# Patient Record
Sex: FEMALE | Race: BLACK OR AFRICAN AMERICAN | NOT HISPANIC OR LATINO | URBAN - METROPOLITAN AREA
[De-identification: names, ages, dates, MRNs, and addresses within clinical notes are randomized per-mention and may not be internally consistent; named-entity substitution may affect disease eponyms.]

---

## 2022-07-28 ENCOUNTER — EMERGENCY (EMERGENCY)
Facility: HOSPITAL | Age: 57
LOS: 0 days | Discharge: LEFT BEFORE DISCHARGE | End: 2022-07-28
Attending: EMERGENCY MEDICINE | Admitting: EMERGENCY MEDICINE

## 2022-07-28 VITALS
SYSTOLIC BLOOD PRESSURE: 140 MMHG | OXYGEN SATURATION: 99 % | RESPIRATION RATE: 20 BRPM | TEMPERATURE: 98 F | DIASTOLIC BLOOD PRESSURE: 60 MMHG | HEART RATE: 80 BPM

## 2022-07-28 DIAGNOSIS — R10.9 UNSPECIFIED ABDOMINAL PAIN: ICD-10-CM

## 2022-07-28 DIAGNOSIS — Z53.21 PROCEDURE AND TREATMENT NOT CARRIED OUT DUE TO PATIENT LEAVING PRIOR TO BEING SEEN BY HEALTH CARE PROVIDER: ICD-10-CM

## 2022-07-28 PROCEDURE — L9991: CPT

## 2023-05-11 ENCOUNTER — APPOINTMENT (OUTPATIENT)
Dept: ORTHOPEDIC SURGERY | Facility: CLINIC | Age: 58
End: 2023-05-11

## 2023-05-11 PROBLEM — Z00.00 ENCOUNTER FOR PREVENTIVE HEALTH EXAMINATION: Status: ACTIVE | Noted: 2023-05-11

## 2023-05-25 ENCOUNTER — FORM ENCOUNTER (OUTPATIENT)
Age: 58
End: 2023-05-25

## 2023-05-25 ENCOUNTER — NON-APPOINTMENT (OUTPATIENT)
Age: 58
End: 2023-05-25

## 2023-05-25 ENCOUNTER — APPOINTMENT (OUTPATIENT)
Dept: ORTHOPEDIC SURGERY | Facility: CLINIC | Age: 58
End: 2023-05-25
Payer: OTHER MISCELLANEOUS

## 2023-05-25 PROCEDURE — 73030 X-RAY EXAM OF SHOULDER: CPT | Mod: RT

## 2023-05-25 PROCEDURE — 99203 OFFICE O/P NEW LOW 30 MIN: CPT | Mod: ACP

## 2023-05-25 RX ORDER — MELOXICAM 15 MG/1
15 TABLET ORAL
Qty: 30 | Refills: 1 | Status: ACTIVE | COMMUNITY
Start: 2023-05-25 | End: 1900-01-01

## 2023-05-25 NOTE — PHYSICAL EXAM
[de-identified] : Physical exam of the right shoulder:\par -No erythema, edema, ecchymosis or acute deformities.  Skin intact\par -No TTP over shoulder joint\par -ROM: forward flexion: 100 degrees, external rotation: 40 degrees, internal rotation: Hand reaches her pants pocket\par -Pain in all planes of motion\par -Decreased strength of forward flexion\par -+2 radial pulse, sensation intact to light touch

## 2023-05-25 NOTE — DATA REVIEWED
[FreeTextEntry1] : X-ray images were obtained at the office today.  AP external, AP internal, Y view of the right shoulder reveal no acute fractures, dislocations, bony abnormalities.

## 2023-05-25 NOTE — DISCUSSION/SUMMARY
[de-identified] : Patient has injury of the right shoulder.  At this time, due to patient's severely limited range of motion and her symptoms, an MRI is warranted for further evaluation of the internal structures, including rotator cuff, for potential surgery.  Patient's in the meantime will do conservative treatment such as alternating with a warm compress or ice.  I also sent patient a prescription for meloxicam 15 mg to take as needed for pain relief.  Patient will stay out of work until follow-up, she is total temporarily disabled.  Patient will call 2 to 3 days after the MRI to discuss results.  Patient agrees to above plan all questions were answered today.

## 2023-05-25 NOTE — HISTORY OF PRESENT ILLNESS
[de-identified] : 57-year-old female presents with left shoulder injury.  Patient was at work on 5/17/2023, and hit her shoulder on a metal piece of furniture.  Since then, patient has had extreme pain in her left shoulder and limited range of motion.  Patient went to pharmacy the next day, her x-rays were taken and read as negative.  Patient has taken over-the-counter anti-inflammatories for pain relief and applied topical rubs for pain relief.  Denies any numbness and tingling.  Denies any past injuries or surgeries to the left shoulder.  Patient is right-hand dominant.

## 2023-07-12 ENCOUNTER — APPOINTMENT (OUTPATIENT)
Dept: ORTHOPEDIC SURGERY | Facility: CLINIC | Age: 58
End: 2023-07-12

## 2023-08-31 ENCOUNTER — NON-APPOINTMENT (OUTPATIENT)
Age: 58
End: 2023-08-31

## 2023-09-01 ENCOUNTER — APPOINTMENT (OUTPATIENT)
Dept: ORTHOPEDIC SURGERY | Facility: CLINIC | Age: 58
End: 2023-09-01
Payer: OTHER MISCELLANEOUS

## 2023-09-01 PROCEDURE — 99214 OFFICE O/P EST MOD 30 MIN: CPT | Mod: 25

## 2023-09-01 PROCEDURE — 73030 X-RAY EXAM OF SHOULDER: CPT | Mod: RT

## 2023-09-01 PROCEDURE — 20611 DRAIN/INJ JOINT/BURSA W/US: CPT | Mod: RT

## 2023-09-01 NOTE — HISTORY OF PRESENT ILLNESS
[de-identified] : Patient is here for evaluation of her right shoulder which she got hurt at work May 17, 2023  On exam she goes 0 to 90 degrees internal rotation of post iliac crest  Impression is frozen shoulder right  X-rays taken show no arthritis no fractures no soft tissue calcifications  Recommended injection ultrasound-guided under sterile conditions of cortisone to the right shoulder she tolerated well we taught her home exercises for stretching as well as gave her an exercise sheet from [employer] as well as requesting formal therapy for the shoulder she unable to work on a percent temporary disabled we will see her back in 6 to 8 weeks Procedure Name: Large Joint Injection / Aspiration: Dexamethasone, Lidocaine Ultrasound and Guidance Large Joint Injection was performed because of pain. Anesthesia: ethyl chloride sprayed topically.. Dexamethasone 1 cc. Needle size: 22 gauge. 1.5 inch. Lidocaine: 2 cc. Needle size: 22 gauge , 1.5 inch.  Medication was injected in the joint. After verbal consent using sterile preparation and technique. The risks, benefits, and alternatives to cortisone injection were explained in full to the patient. Risks outlined include but are not limited to infection, sepsis, bleeding, scarring, skin discoloration, temporary increase in pain, syncopal episode, failure to resolve symptoms, allergic reaction, symptom recurrence, and elevation of blood sugar in diabetics. Patient understood the risks. All questions were answered. After discussion of options, patient requested an injection. Oral informed consent was obtained and sterile prep was done of the injection site. Sterile technique was utilized for the procedure including the preparation of the solutions used for the injection. Patient tolerated the procedure well. Advised to ice the injection site this evening. Prep with alcohol locally to site. Sterile technique used. Post Procedure Instructions:   Ultrasound Guidance was used for the following reasons: To visualize the needle in the joint.  visualization of the needle and placement of injection was performed without complication.

## 2023-11-08 ENCOUNTER — NON-APPOINTMENT (OUTPATIENT)
Age: 58
End: 2023-11-08

## 2023-11-08 ENCOUNTER — APPOINTMENT (OUTPATIENT)
Dept: ORTHOPEDIC SURGERY | Facility: CLINIC | Age: 58
End: 2023-11-08
Payer: OTHER MISCELLANEOUS

## 2023-11-08 PROCEDURE — 99213 OFFICE O/P EST LOW 20 MIN: CPT | Mod: 25

## 2023-11-08 PROCEDURE — 20611 DRAIN/INJ JOINT/BURSA W/US: CPT | Mod: RT

## 2023-12-19 ENCOUNTER — NON-APPOINTMENT (OUTPATIENT)
Age: 58
End: 2023-12-19

## 2023-12-20 ENCOUNTER — APPOINTMENT (OUTPATIENT)
Dept: ORTHOPEDIC SURGERY | Facility: CLINIC | Age: 58
End: 2023-12-20
Payer: OTHER MISCELLANEOUS

## 2023-12-20 PROCEDURE — 99075 MEDICAL TESTIMONY: CPT

## 2024-01-03 ENCOUNTER — APPOINTMENT (OUTPATIENT)
Dept: ORTHOPEDIC SURGERY | Facility: CLINIC | Age: 59
End: 2024-01-03

## 2024-01-19 ENCOUNTER — APPOINTMENT (OUTPATIENT)
Dept: ORTHOPEDIC SURGERY | Facility: CLINIC | Age: 59
End: 2024-01-19
Payer: OTHER MISCELLANEOUS

## 2024-01-19 PROCEDURE — 99213 OFFICE O/P EST LOW 20 MIN: CPT | Mod: ACP

## 2024-01-19 NOTE — HISTORY OF PRESENT ILLNESS
[de-identified] : 58-year-old female here for repeat evaluation of injury sting to the right shoulder, this is a work-related injury on May 17, 2023. Patient had developed frozen shoulder to the right shoulder. Patient states that she felt some mild improvement with the cortisone injection. Patient states that she had a approved therapy for her shoulder.

## 2024-01-19 NOTE — DISCUSSION/SUMMARY
[de-identified] : Impression: Right frozen shoulder  Plan: Patient was advised that she can only have 3 cortisone injections a year, the last injection was in November, at this time I advised to return back on about March for a cortisone injection, patient agrees. Patient was also advised for physical therapy, prescription was given.   Patient remained sterile temporarily stable unable to return to work.  Follow-up: 6 weeks.

## 2024-01-19 NOTE — IMAGING
[de-identified] : Examination of the right shoulder, patient has significant stiffness, decreased range of motion to active and passive.  Patient is only able to forward flex to about 85-90 degrees actively and passively. Patient has significant pain with any slight movement of the shoulder.

## 2024-03-01 ENCOUNTER — APPOINTMENT (OUTPATIENT)
Dept: ORTHOPEDIC SURGERY | Facility: CLINIC | Age: 59
End: 2024-03-01

## 2024-03-08 ENCOUNTER — APPOINTMENT (OUTPATIENT)
Dept: ORTHOPEDIC SURGERY | Facility: CLINIC | Age: 59
End: 2024-03-08
Payer: OTHER MISCELLANEOUS

## 2024-03-08 VITALS — BODY MASS INDEX: 25.48 KG/M2 | WEIGHT: 172 LBS | HEIGHT: 69 IN

## 2024-03-08 PROCEDURE — 99213 OFFICE O/P EST LOW 20 MIN: CPT | Mod: ACP

## 2024-03-08 NOTE — PHYSICAL EXAM
[Right] : right shoulder [4 ___] : forward flexion 4[unfilled]/5 [4___] : internal rotation 4[unfilled]/5 [] : motor and sensory intact distally [TWNoteComboBox7] : active forward flexion 95 degrees [TWNoteComboBox6] : internal rotation 45 degrees [de-identified] : active abduction 85 degrees [de-identified] : external rotation 45 degrees

## 2024-03-08 NOTE — DISCUSSION/SUMMARY
[de-identified] : Impression: Right shoulder injury and adhesive capsulitis  Plan: Patient was advised for physical therapy. Patient was advised for an MRI of the right shoulder to evaluate any possibility of rotator cuff injury and confirm adhesive capsulitis. Patient remains total temporary disabled unable to return to work. Patient was offered a cortisone injection, she is aware that she can only have 3 injections a year so she wants to hold off to that injection  Follow-up: 4 weeks with Dr. Franklin.

## 2024-03-08 NOTE — HISTORY OF PRESENT ILLNESS
[de-identified] : Patient is here for an evaluation of her right shoulder, patient states that her right shoulder pain is due to a work related injury on May 17, 2023. 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 Patient states that since the injury she has been suffering with significant pain, due to pain she was favoring her left arm and appears that she developed adhesive Capsulitis to the right shoulder due to Decreased range of motion on her right shoulder.  Retrospectively our chart indicates that patient was seen in our office on May 25, 2023, at that time patient started developing some painful range of motion to the right shoulder and decreased motor strength.  Patient was then advised for an MRI of the right shoulder, I believe the MRI was declined by the insurance carrier, patient was seen on September 1, 2023 by Dr. Franklin who advised for a cortisone injection for her frozen shoulder (adhesive capsulitis).  Patient has been taking meloxicam for pain, on her subsequent visit on November 8, 2023 patient was doing home therapy exercises, she continued having significant pain and no improvement on the range of motion of her shoulder, she was given another cortisone injection and once more refer for physical therapy. Patient was seen by me on January 19, 2024, at that time she expressed that the cortisone injection had improved some of the symptoms, and she mentioned that she was approved for physical therapy and today she is here for a repeat evaluation. Patient states that her Worker's Compensation case was denied and she was appealing her claim.

## 2024-04-18 ENCOUNTER — APPOINTMENT (OUTPATIENT)
Dept: MRI IMAGING | Facility: CLINIC | Age: 59
End: 2024-04-18

## 2024-04-18 ENCOUNTER — NON-APPOINTMENT (OUTPATIENT)
Age: 59
End: 2024-04-18

## 2024-04-19 ENCOUNTER — APPOINTMENT (OUTPATIENT)
Dept: ORTHOPEDIC SURGERY | Facility: CLINIC | Age: 59
End: 2024-04-19
Payer: OTHER MISCELLANEOUS

## 2024-04-19 PROCEDURE — 99213 OFFICE O/P EST LOW 20 MIN: CPT | Mod: ACP

## 2024-05-03 ENCOUNTER — APPOINTMENT (OUTPATIENT)
Dept: ORTHOPEDIC SURGERY | Facility: CLINIC | Age: 59
End: 2024-05-03

## 2024-05-04 ENCOUNTER — APPOINTMENT (OUTPATIENT)
Dept: MRI IMAGING | Facility: CLINIC | Age: 59
End: 2024-05-04
Payer: OTHER MISCELLANEOUS

## 2024-05-04 PROCEDURE — 73221 MRI JOINT UPR EXTREM W/O DYE: CPT | Mod: RT

## 2024-05-09 RX ORDER — DICLOFENAC SODIUM 75 MG/1
75 TABLET, DELAYED RELEASE ORAL
Qty: 60 | Refills: 0 | Status: ACTIVE | COMMUNITY
Start: 2024-05-09 | End: 1900-01-01

## 2024-05-09 RX ORDER — TIZANIDINE 4 MG/1
4 TABLET ORAL
Qty: 30 | Refills: 0 | Status: ACTIVE | COMMUNITY
Start: 2024-05-09 | End: 1900-01-01

## 2024-05-13 NOTE — PHYSICAL EXAM
[] : no AC joint tenderness [TWNoteComboBox7] : active forward flexion 95 degrees [de-identified] : active abduction 85 degrees [TWNoteComboBox6] : internal rotation 45 degrees [de-identified] : external rotation 45 degrees

## 2024-05-13 NOTE — HISTORY OF PRESENT ILLNESS
[de-identified] : Patient is here for an evaluation of her right shoulder, patient states that her right shoulder pain is due to a work related injury on May 17, 2023. 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 Patient states that since the injury she has been suffering with significant pain, due to pain she was favoring her left arm and appears that she developed adhesive Capsulitis to the right shoulder due to Decreased range of motion on her right shoulder.  Retrospectively our chart indicates that patient was seen in our office on May 25, 2023, at that time patient started developing some painful range of motion to the right shoulder and decreased motor strength.  Patient was then advised for an MRI of the right shoulder, I believe the MRI was declined by the insurance carrier, patient was seen on September 1, 2023 by Dr. Franklin who advised for a cortisone injection for her frozen shoulder (adhesive capsulitis).  Patient has been taking meloxicam for pain, on her subsequent visit on November 8, 2023 patient was doing home therapy exercises, she continued having significant pain and no improvement on the range of motion of her shoulder, she was given another cortisone injection and once more refer for physical therapy. Patient was seen by me on January 19, 2024, at that time she expressed that the cortisone injection had improved some of the symptoms, and she mentioned that she was approved for physical therapy and today she is here for a repeat evaluation. Patient states that her Worker's Compensation case was denied and she was appealing her claim.  Patient is here for repeat evaluation of her right shoulder , denies any improvement to the contrary pain and stiffness worsening. Patient doing self address exercises at home, no improvement. Patient had 2 cortisone injections in the past.

## 2024-05-13 NOTE — DISCUSSION/SUMMARY
[de-identified] : Impression: Right shoulder injury and adhesive capsulitis  Plan: Patient was advised for physical therapy. Patient was advised for an MRI of the right shoulder to evaluate any possibility of rotator cuff injury and confirm adhesive capsulitis. Patient remains total temporary disabled unable to return to work. Patient was taken meloxicam without improvement of symptoms, medication was revised and Diclofenac 75mg PO BID and Tizanidine 4mg PO QHS will be send to the pharmacy.   Follow-up:8 weeks.

## 2024-05-24 ENCOUNTER — APPOINTMENT (OUTPATIENT)
Dept: ORTHOPEDIC SURGERY | Facility: CLINIC | Age: 59
End: 2024-05-24
Payer: OTHER MISCELLANEOUS

## 2024-05-24 PROCEDURE — 99213 OFFICE O/P EST LOW 20 MIN: CPT

## 2024-05-25 NOTE — PHYSICAL EXAM
[Right] : right shoulder [4 ___] : forward flexion 4[unfilled]/5 [4___] : internal rotation 4[unfilled]/5 [] : motor and sensory intact distally [TWNoteComboBox7] : active forward flexion 95 degrees [de-identified] : active abduction 85 degrees [TWNoteComboBox6] : internal rotation 45 degrees [de-identified] : external rotation 45 degrees

## 2024-05-25 NOTE — DISCUSSION/SUMMARY
[de-identified] : Impression: Right shoulder injury and adhesive capsulitis  Plan: Patient was advised for physical therapy. Patient was advised for an MRI of the right shoulder to evaluate any possibility of rotator cuff injury and confirm adhesive capsulitis. Patient remains total temporary disabled unable to return to work. Patient was taken meloxicam without improvement of symptoms, medication was revised and Diclofenac 75mg PO BID and Tizanidine 4mg PO QHS will be send to the pharmacy.   Follow-up:8 weeks.

## 2024-05-25 NOTE — ASSESSMENT
[FreeTextEntry1] : Both agreed to hold off on another injection she is going to continue Mobic do believe physical therapy of the shoulder is appropriate please put a request in for remains totally disabled unable to work I will see her back in about a month to reevaluate her work status

## 2024-05-25 NOTE — IMAGING
[de-identified] : MRI right shoulder 5/4/2024: Trace biceps tenosynovitis downsloping acromion no rotator cuff pathology

## 2024-05-25 NOTE — REASON FOR VISIT
[FreeTextEntry2] : Patient is here for an evaluation of her right shoulder, patient states that her right shoulder pain is due to a work related injury on May 17, 2023. Try to do home exercise She has had a few cortisone injections which have been helpful but not permanent and improvement still not working right handed on Mobic did get MRI right shoulder 5/4/2024

## 2024-05-25 NOTE — HISTORY OF PRESENT ILLNESS
[de-identified] : Patient is here for an evaluation of her right shoulder, patient states that her right shoulder pain is due to a work related injury on May 17, 2023. 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 Patient states that since the injury she has been suffering with significant pain, due to pain she was favoring her left arm and appears that she developed adhesive Capsulitis to the right shoulder due to Decreased range of motion on her right shoulder.  Retrospectively our chart indicates that patient was seen in our office on May 25, 2023, at that time patient started developing some painful range of motion to the right shoulder and decreased motor strength.  Patient was then advised for an MRI of the right shoulder, I believe the MRI was declined by the insurance carrier, patient was seen on September 1, 2023 by Dr. Franklin who advised for a cortisone injection for her frozen shoulder (adhesive capsulitis).  Patient has been taking meloxicam for pain, on her subsequent visit on November 8, 2023 patient was doing home therapy exercises, she continued having significant pain and no improvement on the range of motion of her shoulder, she was given another cortisone injection and once more refer for physical therapy. Patient was seen by me on January 19, 2024, at that time she expressed that the cortisone injection had improved some of the symptoms, and she mentioned that she was approved for physical therapy and today she is here for a repeat evaluation. Patient states that her Worker's Compensation case was denied and she was appealing her claim.  Patient is here for repeat evaluation of her right shoulder , denies any improvement to the contrary pain and stiffness worsening. Patient doing self address exercises at home, no improvement. Patient had 2 cortisone injections in the past.

## 2024-06-14 ENCOUNTER — APPOINTMENT (OUTPATIENT)
Dept: ORTHOPEDIC SURGERY | Facility: CLINIC | Age: 59
End: 2024-06-14

## 2024-07-01 ENCOUNTER — APPOINTMENT (OUTPATIENT)
Dept: ORTHOPEDIC SURGERY | Facility: CLINIC | Age: 59
End: 2024-07-01
Payer: OTHER MISCELLANEOUS

## 2024-07-01 ENCOUNTER — NON-APPOINTMENT (OUTPATIENT)
Age: 59
End: 2024-07-01

## 2024-07-01 DIAGNOSIS — S49.91XA UNSPECIFIED INJURY OF RIGHT SHOULDER AND UPPER ARM, INITIAL ENCOUNTER: ICD-10-CM

## 2024-07-01 DIAGNOSIS — M50.90 CERVICAL DISC DISORDER, UNSPECIFIED, UNSPECIFIED CERVICAL REGION: ICD-10-CM

## 2024-07-01 DIAGNOSIS — M75.01 ADHESIVE CAPSULITIS OF RIGHT SHOULDER: ICD-10-CM

## 2024-07-01 DIAGNOSIS — M54.12 RADICULOPATHY, CERVICAL REGION: ICD-10-CM

## 2024-07-01 PROCEDURE — 72040 X-RAY EXAM NECK SPINE 2-3 VW: CPT

## 2024-07-01 PROCEDURE — 99213 OFFICE O/P EST LOW 20 MIN: CPT

## 2024-07-02 PROBLEM — M75.01 ADHESIVE CAPSULITIS OF RIGHT SHOULDER: Status: ACTIVE | Noted: 2023-09-01

## 2024-07-02 PROBLEM — M50.90 CERVICAL DISC DISEASE: Status: ACTIVE | Noted: 2024-07-02

## 2024-07-02 PROBLEM — S49.91XA RIGHT SHOULDER INJURY, INITIAL ENCOUNTER: Status: ACTIVE | Noted: 2023-05-25

## 2024-07-02 PROBLEM — M54.12 CERVICAL RADICULOPATHY, ACUTE: Status: ACTIVE | Noted: 2024-07-02

## 2024-08-12 ENCOUNTER — APPOINTMENT (OUTPATIENT)
Dept: ORTHOPEDIC SURGERY | Facility: CLINIC | Age: 59
End: 2024-08-12
Payer: OTHER MISCELLANEOUS

## 2024-08-12 ENCOUNTER — NON-APPOINTMENT (OUTPATIENT)
Age: 59
End: 2024-08-12

## 2024-08-12 DIAGNOSIS — M54.12 RADICULOPATHY, CERVICAL REGION: ICD-10-CM

## 2024-08-12 DIAGNOSIS — M50.90 CERVICAL DISC DISORDER, UNSPECIFIED, UNSPECIFIED CERVICAL REGION: ICD-10-CM

## 2024-08-12 DIAGNOSIS — S49.91XA UNSPECIFIED INJURY OF RIGHT SHOULDER AND UPPER ARM, INITIAL ENCOUNTER: ICD-10-CM

## 2024-08-12 DIAGNOSIS — M75.01 ADHESIVE CAPSULITIS OF RIGHT SHOULDER: ICD-10-CM

## 2024-08-12 PROCEDURE — 99213 OFFICE O/P EST LOW 20 MIN: CPT

## 2024-08-12 NOTE — REASON FOR VISIT
[FreeTextEntry2] : Patient is coming in for a follow up on cervical spine.  Has been working the last month 5 days a week feels that it has been too much PT and cervical MRI both denied did have the MRI of the right shoulder 5/4/2024 still using Advil

## 2024-08-12 NOTE — IMAGING
[de-identified] : MRI right shoulder 5/4/2024: Trace biceps tenosynovitis downsloping acromion no rotator cuff  X-ray cervical spine moderate marked anterior bridging osteophytes and degenerative disc changes C5-6

## 2024-08-12 NOTE — ASSESSMENT
[FreeTextEntry1] :  agreed to hold off on another injection  she is going to continue Mobic do believe physical therapy of the shoulder and c spine are appropriate please put a request At this time we will going to attempt to return to work 3 days a week   note provided she has a moderate to marked partial temporary disability I will see her in a few months please put a request in for an MRI of the cervical spine again

## 2024-08-12 NOTE — IMAGING
[de-identified] : MRI right shoulder 5/4/2024: Trace biceps tenosynovitis downsloping acromion no rotator cuff  X-ray cervical spine moderate marked anterior bridging osteophytes and degenerative disc changes C5-6

## 2024-08-12 NOTE — HISTORY OF PRESENT ILLNESS
[de-identified] : Patient is here for an evaluation of her right shoulder, patient states that her right shoulder pain is due to a work related injury on May 17, 2023. 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 Patient states that since the injury she has been suffering with significant pain, due to pain she was favoring her left arm and appears that she developed adhesive Capsulitis to the right shoulder due to Decreased range of motion on her right shoulder.  Retrospectively our chart indicates that patient was seen in our office on May 25, 2023, at that time patient started developing some painful range of motion to the right shoulder and decreased motor strength.  Patient was then advised for an MRI of the right shoulder, I believe the MRI was declined by the insurance carrier, patient was seen on September 1, 2023 by Dr. Franklin who advised for a cortisone injection for her frozen shoulder (adhesive capsulitis).  Patient has been taking meloxicam for pain, on her subsequent visit on November 8, 2023 patient was doing home therapy exercises, she continued having significant pain and no improvement on the range of motion of her shoulder, she was given another cortisone injection and once more refer for physical therapy. Patient was seen by me on January 19, 2024, at that time she expressed that the cortisone injection had improved some of the symptoms, and she mentioned that she was approved for physical therapy and today she is here for a repeat evaluation. Patient states that her Worker's Compensation case was denied and she was appealing her claim.  Patient is here for repeat evaluation of her right shoulder , denies any improvement to the contrary pain and stiffness worsening. Patient doing self address exercises at home, no improvement. Patient had 2 cortisone injections in the past.

## 2024-08-12 NOTE — ASSESSMENT
[FreeTextEntry1] :  agreed to hold off on another injection  she is going to continue Mobic do believe physical therapy of the shoulder and c spine are appropriate please put a request At this time we will going to attempt to return to work 3 days a week   note provided she has a moderate to marked partial temporary disability I will see her in a few months please put a request in for an MRI of the cervical spine again 2 = A lot of assistance

## 2024-08-12 NOTE — PHYSICAL EXAM
[FreeTextEntry3] : Guarded motion in the neck with spasm mostly to the right side paraspinal and trapezius mild dysesthesia upper arm [Right] : right shoulder [4 ___] : forward flexion 4[unfilled]/5 [4___] : internal rotation 4[unfilled]/5 [] : motor and sensory intact distally [TWNoteComboBox7] : active forward flexion 95 degrees [de-identified] : active abduction 85 degrees [TWNoteComboBox6] : internal rotation 45 degrees [de-identified] : external rotation 45 degrees

## 2024-08-12 NOTE — HISTORY OF PRESENT ILLNESS
[de-identified] : Patient is here for an evaluation of her right shoulder, patient states that her right shoulder pain is due to a work related injury on May 17, 2023. 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 Patient states that since the injury she has been suffering with significant pain, due to pain she was favoring her left arm and appears that she developed adhesive Capsulitis to the right shoulder due to Decreased range of motion on her right shoulder.  Retrospectively our chart indicates that patient was seen in our office on May 25, 2023, at that time patient started developing some painful range of motion to the right shoulder and decreased motor strength.  Patient was then advised for an MRI of the right shoulder, I believe the MRI was declined by the insurance carrier, patient was seen on September 1, 2023 by Dr. Franklin who advised for a cortisone injection for her frozen shoulder (adhesive capsulitis).  Patient has been taking meloxicam for pain, on her subsequent visit on November 8, 2023 patient was doing home therapy exercises, she continued having significant pain and no improvement on the range of motion of her shoulder, she was given another cortisone injection and once more refer for physical therapy. Patient was seen by me on January 19, 2024, at that time she expressed that the cortisone injection had improved some of the symptoms, and she mentioned that she was approved for physical therapy and today she is here for a repeat evaluation. Patient states that her Worker's Compensation case was denied and she was appealing her claim.  Patient is here for repeat evaluation of her right shoulder , denies any improvement to the contrary pain and stiffness worsening. Patient doing self address exercises at home, no improvement. Patient had 2 cortisone injections in the past.

## 2024-08-12 NOTE — PHYSICAL EXAM
[FreeTextEntry3] : Guarded motion in the neck with spasm mostly to the right side paraspinal and trapezius mild dysesthesia upper arm [Right] : right shoulder [4 ___] : forward flexion 4[unfilled]/5 [4___] : internal rotation 4[unfilled]/5 [] : motor and sensory intact distally [TWNoteComboBox7] : active forward flexion 95 degrees [de-identified] : active abduction 85 degrees [TWNoteComboBox6] : internal rotation 45 degrees [de-identified] : external rotation 45 degrees

## 2024-10-08 ENCOUNTER — APPOINTMENT (OUTPATIENT)
Dept: ORTHOPEDIC SURGERY | Facility: CLINIC | Age: 59
End: 2024-10-08
Payer: OTHER MISCELLANEOUS

## 2024-10-08 DIAGNOSIS — M50.90 CERVICAL DISC DISORDER, UNSPECIFIED, UNSPECIFIED CERVICAL REGION: ICD-10-CM

## 2024-10-08 DIAGNOSIS — M75.01 ADHESIVE CAPSULITIS OF RIGHT SHOULDER: ICD-10-CM

## 2024-10-08 DIAGNOSIS — M54.12 RADICULOPATHY, CERVICAL REGION: ICD-10-CM

## 2024-10-08 DIAGNOSIS — S49.91XA UNSPECIFIED INJURY OF RIGHT SHOULDER AND UPPER ARM, INITIAL ENCOUNTER: ICD-10-CM

## 2024-10-08 PROCEDURE — 99213 OFFICE O/P EST LOW 20 MIN: CPT

## 2024-12-10 ENCOUNTER — APPOINTMENT (OUTPATIENT)
Dept: ORTHOPEDIC SURGERY | Facility: CLINIC | Age: 59
End: 2024-12-10
Payer: OTHER MISCELLANEOUS

## 2024-12-10 DIAGNOSIS — M50.90 CERVICAL DISC DISORDER, UNSPECIFIED, UNSPECIFIED CERVICAL REGION: ICD-10-CM

## 2024-12-10 DIAGNOSIS — M54.12 RADICULOPATHY, CERVICAL REGION: ICD-10-CM

## 2024-12-10 DIAGNOSIS — S49.91XA UNSPECIFIED INJURY OF RIGHT SHOULDER AND UPPER ARM, INITIAL ENCOUNTER: ICD-10-CM

## 2024-12-10 DIAGNOSIS — M75.01 ADHESIVE CAPSULITIS OF RIGHT SHOULDER: ICD-10-CM

## 2024-12-10 PROCEDURE — 99213 OFFICE O/P EST LOW 20 MIN: CPT

## 2025-03-31 ENCOUNTER — APPOINTMENT (OUTPATIENT)
Dept: ORTHOPEDIC SURGERY | Facility: CLINIC | Age: 60
End: 2025-03-31
Payer: OTHER MISCELLANEOUS

## 2025-03-31 ENCOUNTER — NON-APPOINTMENT (OUTPATIENT)
Age: 60
End: 2025-03-31

## 2025-03-31 DIAGNOSIS — M54.12 RADICULOPATHY, CERVICAL REGION: ICD-10-CM

## 2025-03-31 DIAGNOSIS — M50.90 CERVICAL DISC DISORDER, UNSPECIFIED, UNSPECIFIED CERVICAL REGION: ICD-10-CM

## 2025-03-31 DIAGNOSIS — M75.01 ADHESIVE CAPSULITIS OF RIGHT SHOULDER: ICD-10-CM

## 2025-03-31 DIAGNOSIS — S49.91XA UNSPECIFIED INJURY OF RIGHT SHOULDER AND UPPER ARM, INITIAL ENCOUNTER: ICD-10-CM

## 2025-03-31 PROCEDURE — 99213 OFFICE O/P EST LOW 20 MIN: CPT

## 2025-04-18 ENCOUNTER — APPOINTMENT (OUTPATIENT)
Dept: ORTHOPEDIC SURGERY | Facility: CLINIC | Age: 60
End: 2025-04-18
Payer: OTHER MISCELLANEOUS

## 2025-04-18 DIAGNOSIS — M77.8 OTHER ENTHESOPATHIES, NOT ELSEWHERE CLASSIFIED: ICD-10-CM

## 2025-04-18 DIAGNOSIS — M50.90 CERVICAL DISC DISORDER, UNSPECIFIED, UNSPECIFIED CERVICAL REGION: ICD-10-CM

## 2025-04-18 DIAGNOSIS — Z00.00 ENCOUNTER FOR GENERAL ADULT MEDICAL EXAMINATION W/OUT ABNORMAL FINDINGS: ICD-10-CM

## 2025-04-18 DIAGNOSIS — M54.12 RADICULOPATHY, CERVICAL REGION: ICD-10-CM

## 2025-04-18 DIAGNOSIS — S49.91XA UNSPECIFIED INJURY OF RIGHT SHOULDER AND UPPER ARM, INITIAL ENCOUNTER: ICD-10-CM

## 2025-04-18 PROCEDURE — 99213 OFFICE O/P EST LOW 20 MIN: CPT

## 2025-05-29 ENCOUNTER — APPOINTMENT (OUTPATIENT)
Dept: ORTHOPEDIC SURGERY | Facility: CLINIC | Age: 60
End: 2025-05-29
Payer: OTHER MISCELLANEOUS

## 2025-05-29 DIAGNOSIS — M54.12 RADICULOPATHY, CERVICAL REGION: ICD-10-CM

## 2025-05-29 DIAGNOSIS — M77.8 OTHER ENTHESOPATHIES, NOT ELSEWHERE CLASSIFIED: ICD-10-CM

## 2025-05-29 DIAGNOSIS — M75.01 ADHESIVE CAPSULITIS OF RIGHT SHOULDER: ICD-10-CM

## 2025-05-29 DIAGNOSIS — S49.91XA UNSPECIFIED INJURY OF RIGHT SHOULDER AND UPPER ARM, INITIAL ENCOUNTER: ICD-10-CM

## 2025-05-29 DIAGNOSIS — M50.90 CERVICAL DISC DISORDER, UNSPECIFIED, UNSPECIFIED CERVICAL REGION: ICD-10-CM

## 2025-05-29 PROCEDURE — 99213 OFFICE O/P EST LOW 20 MIN: CPT

## 2025-07-28 ENCOUNTER — APPOINTMENT (OUTPATIENT)
Dept: ORTHOPEDIC SURGERY | Facility: CLINIC | Age: 60
End: 2025-07-28
Payer: OTHER MISCELLANEOUS

## 2025-07-28 PROCEDURE — 99455 WORK RELATED DISABILITY EXAM: CPT

## 2025-08-18 ENCOUNTER — NON-APPOINTMENT (OUTPATIENT)
Age: 60
End: 2025-08-18

## 2025-08-18 ENCOUNTER — APPOINTMENT (OUTPATIENT)
Dept: ORTHOPEDIC SURGERY | Facility: CLINIC | Age: 60
End: 2025-08-18
Payer: OTHER MISCELLANEOUS

## 2025-08-18 DIAGNOSIS — M50.90 CERVICAL DISC DISORDER, UNSPECIFIED, UNSPECIFIED CERVICAL REGION: ICD-10-CM

## 2025-08-18 DIAGNOSIS — S49.91XA UNSPECIFIED INJURY OF RIGHT SHOULDER AND UPPER ARM, INITIAL ENCOUNTER: ICD-10-CM

## 2025-08-18 DIAGNOSIS — M75.01 ADHESIVE CAPSULITIS OF RIGHT SHOULDER: ICD-10-CM

## 2025-08-18 DIAGNOSIS — M77.8 OTHER ENTHESOPATHIES, NOT ELSEWHERE CLASSIFIED: ICD-10-CM

## 2025-08-18 DIAGNOSIS — M54.12 RADICULOPATHY, CERVICAL REGION: ICD-10-CM

## 2025-08-18 PROCEDURE — 99214 OFFICE O/P EST MOD 30 MIN: CPT | Mod: ACP

## 2025-08-18 RX ORDER — TIZANIDINE 4 MG/1
4 TABLET ORAL
Qty: 30 | Refills: 0 | Status: ACTIVE | COMMUNITY
Start: 2025-08-18 | End: 1900-01-01

## 2025-08-18 RX ORDER — NABUMETONE 750 MG/1
750 TABLET, FILM COATED ORAL
Qty: 60 | Refills: 0 | Status: ACTIVE | COMMUNITY
Start: 2025-08-18 | End: 1900-01-01